# Patient Record
Sex: MALE | Race: WHITE
[De-identification: names, ages, dates, MRNs, and addresses within clinical notes are randomized per-mention and may not be internally consistent; named-entity substitution may affect disease eponyms.]

---

## 2020-12-08 ENCOUNTER — HOSPITAL ENCOUNTER (OUTPATIENT)
Dept: HOSPITAL 53 - M LABSMTC | Age: 55
End: 2020-12-08
Attending: FAMILY MEDICINE
Payer: COMMERCIAL

## 2020-12-08 DIAGNOSIS — Z11.59: Primary | ICD-10-CM

## 2021-01-05 ENCOUNTER — HOSPITAL ENCOUNTER (EMERGENCY)
Dept: HOSPITAL 53 - M ED | Age: 56
LOS: 1 days | Discharge: HOME | End: 2021-01-06
Payer: SELF-PAY

## 2021-01-05 VITALS
BODY MASS INDEX: 20.45 KG/M2 | HEIGHT: 70 IN | SYSTOLIC BLOOD PRESSURE: 137 MMHG | WEIGHT: 142.86 LBS | DIASTOLIC BLOOD PRESSURE: 93 MMHG
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
## 2021-01-05 DIAGNOSIS — S01.01XA: Primary | ICD-10-CM
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
## 2021-01-05 DIAGNOSIS — K21.9: ICD-10-CM

## 2021-01-05 DIAGNOSIS — I95.1: ICD-10-CM

## 2021-01-05 DIAGNOSIS — F43.10: ICD-10-CM

## 2021-01-05 PROCEDURE — 70450 CT HEAD/BRAIN W/O DYE: CPT

## 2021-01-05 PROCEDURE — 96372 THER/PROPH/DIAG INJ SC/IM: CPT

## 2021-01-05 PROCEDURE — 99282 EMERGENCY DEPT VISIT SF MDM: CPT

## 2021-01-05 PROCEDURE — 12001 RPR S/N/AX/GEN/TRNK 2.5CM/<: CPT

## 2021-01-06 NOTE — REPVR
PROCEDURE INFORMATION: 

Exam: CT Head Without Contrast 

Exam date and time: 1/6/2021 1:38 AM 

Age: 55 years old 

Clinical indication: Injury or trauma; Fall; Concussion/head injury; 

Consciousness not specified; Additional info: Fall, hitting back of head 



TECHNIQUE: 

Imaging protocol: Computed tomography of the head without contrast. 

Radiation optimization: All CT scans at this facility use at least one of these 

dose optimization techniques: automated exposure control; mA and/or kV 

adjustment per patient size (includes targeted exams where dose is matched to 

clinical indication); or iterative reconstruction. 



COMPARISON: 

No relevant prior studies available. 



FINDINGS: 

Brain: No acute intracranial hemorrhage, midline shift or intracranial mass 

effect. No cerebral edema. 

Cerebral ventricles: No hydrocephalus. 

Bones/joints: Unremarkable. No acute fracture. 

Paranasal sinuses: Visualized sinuses are unremarkable. No fluid levels. 

Mastoid air cells: Visualized mastoid air cells are well aerated. 

Soft tissues: Unremarkable. 



IMPRESSION: 

No acute intracranial abnormality. 



Electronically signed by: Timi Teran On 01/06/2021  02:18:07 AM

## 2021-01-13 ENCOUNTER — HOSPITAL ENCOUNTER (EMERGENCY)
Dept: HOSPITAL 53 - M ED | Age: 56
Discharge: HOME | End: 2021-01-13
Payer: OTHER GOVERNMENT

## 2021-01-13 VITALS — WEIGHT: 149.69 LBS | BODY MASS INDEX: 20.96 KG/M2 | HEIGHT: 71 IN

## 2021-01-13 VITALS — SYSTOLIC BLOOD PRESSURE: 142 MMHG | DIASTOLIC BLOOD PRESSURE: 96 MMHG

## 2021-01-13 DIAGNOSIS — Z48.02: Primary | ICD-10-CM

## 2021-01-13 NOTE — CCD
Summarization Of Episode

                             Created on: 2021



MARY SAMPSON

External Reference #: [id number]

: 1965

Sex: Male



Demographics





                          Address                   [address] [place]  [address]

 

                          Home Phone                [phone]

 

                          Preferred Language        English

 

                          Marital Status            Single

 

                          Mormonism Affiliation     NONE

 

                          Race                      White

 

                          Ethnic Group              Not  or 





Author





                          Author                    [facility]

 

                          Address                   Unknown

 

                          Phone                     Unavailable







Support





                Name            Relationship    Address         Phone

 

                    MARITZA SAMPSON   Next Of Kin         [address]  [id number] [phone]

 

                DISABLED        Next Of Kin     Unknown         Unavailable



                                  



Re-disclosure Warning

          The records that you are about to access may contain information from 
federally-assisted alcohol or drug abuse programs. If such information is 
present, then the following federally mandated warning applies: This information
has been disclosed to you from records protected by federal confidentiality 
rules (42 CFR part 2). The federal rules prohibit you from making any further 
disclosure of this information unless further disclosure is expressly permitted 
by the written consent of the person to whom it pertains or as otherwise 
permitted by 42 CFR part 2. A general authorization for the release of medical 
or other information is NOT sufficient for this purpose. The Federal rules 
restrict any use of the information to criminally investigate or prosecute any 
alcohol or drug abuse patient.The records that you are about to access may 
contain highly sensitive health information, the redisclosure of which is 
protected by Article 27-F of the [facility] Public Health law. If you 
continue you may have access to information: Regarding HIV / AIDS; Provided by 
facilities licensed or operated by the [facility] Office of Mental Health; 
or Provided by the [facility] Office for People With Developmental 
Disabilities. If such information is present, then the following [place] 
mandated warning applies: This information has been disclosed to you from 
confidential records which are protected by state law. State law prohibits you 
from making any further disclosure of this information without the specific 
written consent of the person to whom it pertains, or as otherwise permitted by 
law. Any unauthorized further disclosure in violation of state law may result in
a fine or group home sentence or both. A general authorization for the release of 
medical or other information is NOT sufficient authorization for further disc
losure.                                                          



Insurance Providers

          



             Payer name   Policy type / Coverage type Policy ID    Covered party

 ID Covered 

party's relationship to flowers Policy [employer]             Plan Information

 

          [employer]           [id number]                       

      [id number]

 

          SELF PAY ONLY           [id number]                             [id number]

[id number]



                                                                                
                 



Results

          



                    ID                  Date                Data Source

 

                    [id number]         2020 09:00:00 AM EST [facility]









          Name      Value     Range     Interpretation Code Description Data Luba

rce(s) Supporting 

Document(s)

 

          SARS coronavirus 2 RNA                                         [facility] 

    

 

                                        This lab was ordered by [facility] and reported by [facility]. 









                                        Procedure